# Patient Record
(demographics unavailable — no encounter records)

---

## 2018-01-01 NOTE — NONINVASIVE CARDIOLOGY REPORT
ECHOCARDIOGRAPHY REPORT



PATIENT NAME:  DAVY FELIX

MRN:  [id number]        ACCT#:  [id number]  ROOM#:  [id number]

DATE OF SERVICE: 2018                            :  2018

REFERRING MD:  Mian Hsu MD

ORDER #:  [id number]

INDICATION:  Heart murmur.



STUDY TYPE:  Complete congenital 2D, Doppler, and color flow

echocardiogram.



REPORT



TWO-D SECTOR SCAN:  Two-dimensional echocardiography demonstrates atrial

situs solitus with atrioventricular and ventriculoarterial concordance. 

Both atria and ventricles are of normal size, with normal function.  Both

AV valves and semilunar valves have normal anatomy and excursion.  A

patent foramen ovale is present.  The ventricular septum appears intact. 

The main pulmonary artery is of normal size, with normal right and left

branches.  There is a left-sided aortic arch with evidence of a patent

ductus arteriosus, but no coarctation.  Coronary venous return is normal.



DOPPLER INTERROGATION:  There is left to right shunting through a small

patent ductus arteriosus.



COLOR FLOW DOPPLER:  Left to right shunting through a small patent ductus

arteriosus and patent foramen ovale.



M-MODE DATA:  Not obtained.



FINAL INTERPRETATION:

1.   The study had suboptimal imaging.

2.   There is a small patent ductus arteriosus with left to right shunt.

3.   There is a patent foramen ovale with left to right shunt.

4.   Small VSD could have been due to quality of study.

4.   Otherwise normal intracardiac anatomy with normal function.



INTERPRETING PHYSICIAN: BABAK RIOS M.D.









/:  [id number]      DT:  2018 TT:  1600      ID:  [id number]

/:  [id number]      DD:  2018 TD:  0926     JOB:  [id number]



cc:BABAK RIOS M.D.

>







MTDD